# Patient Record
Sex: FEMALE
[De-identification: names, ages, dates, MRNs, and addresses within clinical notes are randomized per-mention and may not be internally consistent; named-entity substitution may affect disease eponyms.]

---

## 2020-11-08 ENCOUNTER — RESULT REVIEW (OUTPATIENT)
Age: 40
End: 2020-11-08

## 2020-11-08 ENCOUNTER — TRANSCRIPTION ENCOUNTER (OUTPATIENT)
Age: 40
End: 2020-11-08

## 2020-11-11 ENCOUNTER — APPOINTMENT (OUTPATIENT)
Dept: OTOLARYNGOLOGY | Facility: CLINIC | Age: 40
End: 2020-11-11
Payer: COMMERCIAL

## 2020-11-11 VITALS
BODY MASS INDEX: 17.85 KG/M2 | SYSTOLIC BLOOD PRESSURE: 108 MMHG | DIASTOLIC BLOOD PRESSURE: 68 MMHG | HEIGHT: 62 IN | WEIGHT: 97 LBS

## 2020-11-11 DIAGNOSIS — H93.8X1 OTHER SPECIFIED DISORDERS OF RIGHT EAR: ICD-10-CM

## 2020-11-11 DIAGNOSIS — H69.81 OTHER SPECIFIED DISORDERS OF EUSTACHIAN TUBE, RIGHT EAR: ICD-10-CM

## 2020-11-11 PROBLEM — Z00.00 ENCOUNTER FOR PREVENTIVE HEALTH EXAMINATION: Status: ACTIVE | Noted: 2020-11-11

## 2020-11-11 PROCEDURE — 31231 NASAL ENDOSCOPY DX: CPT

## 2020-11-11 PROCEDURE — 92557 COMPREHENSIVE HEARING TEST: CPT

## 2020-11-11 PROCEDURE — 99203 OFFICE O/P NEW LOW 30 MIN: CPT | Mod: 25

## 2020-11-11 PROCEDURE — 92550 TYMPANOMETRY & REFLEX THRESH: CPT

## 2020-11-11 PROCEDURE — 99072 ADDL SUPL MATRL&STAF TM PHE: CPT

## 2020-11-11 NOTE — HISTORY OF PRESENT ILLNESS
[de-identified] : Patient presents today with c/o right ear muffled sound. Patient states started about 1 month ago. No pain associated. She feels pressure from time to time. No recent URI. When humming she feels an echo in the right ear. When ear is muffled she can hear but not as great. Patient currently 8 weeks pregnant.

## 2020-11-11 NOTE — PROCEDURE
[Flexible Endoscope] : examined with the flexible endoscope [Normal] : the paranasal sinuses had no abnormalities